# Patient Record
(demographics unavailable — no encounter records)

---

## 2025-02-10 NOTE — DISCUSSION/SUMMARY
[de-identified] : 67-year-old female with chronic low back pain and right hip pain.  She has arthritis of the lumbar spine and sciatica.  She has mild hip arthritis, trochanteric bursitis and abductor tendinitis.  She has significant weakness and gait instability, and this is likely due to deconditioning and not disc herniation or spinal stenosis as shown on spine MRI.  I recommend physical therapy and pain management.  She will follow-up to make a separate appointment for her knee evaluation.

## 2025-02-10 NOTE — PHYSICAL EXAM
[Walker] : ambulates with walker [de-identified] : General: No acute distress Mental: Alert and oriented x3 Eyes: Conjunctivitis not seen Chest: Symmetric chest rise, no audible wheezing Skin: Bilateral lower extremities absent from rashes and ulcers Abdomen: No distention  Back: Tender of the right paraspinals, TTP right sacroiliac joint Right hip 4/5 hip flexion  Right hip: Skin: Clean, dry and intact Inspection: No obvious deformity, no swelling, no ecchymosis. Tenderness: Positive tenderness over greater trochanter/gluteus medius insertion. No tenderness pubic symphysis, pubic tubercle, hip flexors. No tenderness ischial tuberosity or buttock. Nontender over the ASIS/Illiac crest. ROM: 0-120. Internal rotation 40, external rotation 70 Special tests: negative Stinchfield, negative FADIR, negative JEANNETTE, negative logroll Additional tests: No pain with circumduction, negative impingement test at 90 Strength: 5/5 hip flexion/Abduction/Q/H/TA/GS/EHL Neuro: Sensation intact to light touch throughout in dp/sp/tib/lauren/saph distributions Pulses: 2+ DP/PT pulses  Hunched gait with walker [de-identified] : Pelvis and right hip x-ray shows mild joint space narrowing with subchondral sclerosis, no major osteophytes, no fracture.  MRI lumbar, thoracic, cervical spine reviewed and [facility] imaging.

## 2025-02-10 NOTE — HISTORY OF PRESENT ILLNESS
[de-identified] : 67-year-old female with chronic low back pain and right hip pain.  She reports low back pain for many years and 2 months of worsening pain radiating to the right hip.  Pain is on the lateral aspect and buttock, and denies anterior hip/groin pain.  She ambulates with a walker due to stiffness and gait instability.  Her pain is increased with walking and she also reports chronic and severe right knee pain.  She does not have pain radiating down the leg, although she does endorse altered sensation in the foot.  She has been told her right knee needs surgery.  She has done physical therapy for her knees and taking NSAIDs as needed.  Her main issue today is her hip.  She had imaging at [facility].